# Patient Record
Sex: FEMALE | Race: WHITE | NOT HISPANIC OR LATINO | URBAN - METROPOLITAN AREA
[De-identification: names, ages, dates, MRNs, and addresses within clinical notes are randomized per-mention and may not be internally consistent; named-entity substitution may affect disease eponyms.]

---

## 2024-09-29 ENCOUNTER — EMERGENCY (EMERGENCY)
Facility: HOSPITAL | Age: 64
LOS: 1 days | Discharge: HOME | End: 2024-09-29
Attending: EMERGENCY MEDICINE | Admitting: EMERGENCY MEDICINE
Payer: MEDICAID

## 2024-09-29 VITALS
HEART RATE: 78 BPM | RESPIRATION RATE: 18 BRPM | DIASTOLIC BLOOD PRESSURE: 76 MMHG | HEIGHT: 67 IN | TEMPERATURE: 98 F | OXYGEN SATURATION: 96 % | WEIGHT: 192.9 LBS | SYSTOLIC BLOOD PRESSURE: 154 MMHG

## 2024-09-29 PROCEDURE — 93010 ELECTROCARDIOGRAM REPORT: CPT

## 2024-09-29 PROCEDURE — 99284 EMERGENCY DEPT VISIT MOD MDM: CPT

## 2024-09-30 NOTE — ED PROVIDER NOTE - CLINICAL SUMMARY MEDICAL DECISION MAKING FREE TEXT BOX
Patient is a 64-year-old female with past medical history hypertension who no longer requires medication presented to ED with concern for elevated blood pressure.  Pt reports systolic 180 at home. Pt reports she is not having any headache at this time- BP in /76, EKG normal. Patient is a 64-year-old female with past medical history hypertension who no longer requires medication presented to ED with concern for elevated blood pressure.  Pt reports systolic 180 at home. Pt reports she is not having any headache at this time- BP in /76, EKG normal. will d/c with PCP follow up

## 2024-09-30 NOTE — ED ADULT NURSE NOTE - CHIEF COMPLAINT QUOTE
Pt c/o hypertension. Pt c/o headache x 1 week. States she was checking her BP at home and  it was 180/100. No known hx of HTN. Denies blurry vision, CP, weakness, dizziness. No neuro deficits noted. Denies PMHx

## 2024-09-30 NOTE — ED PROVIDER NOTE - OBJECTIVE STATEMENT
Patient is a 64-year-old female with past medical history hypertension who no longer requires medication presented to ED with concern for elevated blood pressure.  Patient reports over the past few days she has felt a pressure-like sensation on the right side of her forehead.  Patient also reports for the past 3 weeks she has felt with tingling sensation and pain starting below her left breast and wraps around to the left side of her back.  Patient reports she has been checking her blood pressure at home for the past few days since she was having headache and today systolic blood pressure was as high as 180 which concerned her.  Patient reports many years ago she was on metoprolol for blood pressure and believes she needs to be restarted on this medication.  Patient otherwise denies fevers, chills, chest pain, palpitations, shortness of breath, abdominal pain, nausea, vomiting, syncope, blurry vision, dizziness, rashes.

## 2024-09-30 NOTE — ED PROVIDER NOTE - ATTENDING APP SHARED VISIT CONTRIBUTION OF CARE
63 y/o F with previous h/o HTN (no longer on medications) p/w high blood pressure readings at home.  She states that for the past 3 days her BP has been running high.  Pt also c/o headaches, which are relieved with Tylenol.  No HA at the time of my evaluation.  No fever, vision changes, vomiting, cp, sob, abd pain, weakness.      Well but anxious appearing, sitting comfortably in stretcher, awake and alert, nontoxic.  AF/VSS. NCAT EOMI PERRL, neck supple.  Lungs cta bl.  Cards nl S1/S2, RRR, no MRG.  Abd soft ntnd.  No pedal edema or calf tenderness.  No focal neuro deficits.    Pt with asymptomatic high blood pressure, with unconcerning reading in ED.  Exam is normal and she has no signs or sxs of end-organ damage to suggest hypertensive emergency.  Reassurance, return precautions, outpatient PMD follow-up for further BP eval/mgmt as needed.

## 2024-09-30 NOTE — ED PROVIDER NOTE - NSFOLLOWUPINSTRUCTIONS_ED_ALL_ED_FT
You were seen in the emergency department for high blood pressure.  Your blood pressure and physical exam was not concerning.  Please follow-up with your PMD for routine evaluation of your blood pressure.    Drink plenty of fluids.  You can take ibuprofen 600mg every 6 hours or Tylenol 650mg every 4 hours as needed for pain or fever.  Follow-up with your PMD in 24-48 hours.  Return to the emergency department for any new or worsening symptoms, such as headaches that are not relieved with over-the-counter medications, vision loss, intractable vomiting, chest pain, difficulty breathing, weakness to one side of the body, confusion or alteration in consciousness, or any other concerns.

## 2024-09-30 NOTE — ED ADULT NURSE NOTE - OBJECTIVE STATEMENT
Patient received to 27, A&Ox3 ambulatory at baseline, breathing even and unlabored, presenting to ED c/o elevated blood pressure. No RN intervention, provider at bedside providing dispo paperwork.

## 2024-09-30 NOTE — ED PROVIDER NOTE - PATIENT PORTAL LINK FT
You can access the FollowMyHealth Patient Portal offered by [facility] by registering at the following website: [email or web address] By joining [employer]’s FollowMyHealth portal, you will also be able to view your health information using other applications (apps) compatible with our system.

## 2024-10-23 ENCOUNTER — APPOINTMENT (OUTPATIENT)
Dept: PODIATRY | Facility: CLINIC | Age: 64
End: 2024-10-23

## 2025-03-26 ENCOUNTER — NON-APPOINTMENT (OUTPATIENT)
Age: 65
End: 2025-03-26

## 2025-03-26 ENCOUNTER — APPOINTMENT (OUTPATIENT)
Dept: OBGYN | Facility: CLINIC | Age: 65
End: 2025-03-26
Payer: MEDICAID

## 2025-03-26 VITALS
BODY MASS INDEX: 31.5 KG/M2 | SYSTOLIC BLOOD PRESSURE: 142 MMHG | WEIGHT: 196 LBS | HEIGHT: 66 IN | HEART RATE: 94 BPM | DIASTOLIC BLOOD PRESSURE: 79 MMHG

## 2025-03-26 DIAGNOSIS — Z01.419 ENCOUNTER FOR GYNECOLOGICAL EXAMINATION (GENERAL) (ROUTINE) W/OUT ABNORMAL FINDINGS: ICD-10-CM

## 2025-03-26 PROCEDURE — 99386 PREV VISIT NEW AGE 40-64: CPT

## 2025-03-26 PROCEDURE — 99459 PELVIC EXAMINATION: CPT

## 2025-03-27 LAB — HPV HIGH+LOW RISK DNA PNL CVX: NOT DETECTED

## 2025-04-06 LAB — CYTOLOGY CVX/VAG DOC THIN PREP: ABNORMAL

## 2025-04-10 ENCOUNTER — APPOINTMENT (OUTPATIENT)
Dept: OBGYN | Facility: CLINIC | Age: 65
End: 2025-04-10
Payer: MEDICAID

## 2025-04-10 DIAGNOSIS — R87.612 LOW GRADE SQUAMOUS INTRAEPITHELIAL LESION ON CYTOLOGIC SMEAR OF CERVIX (LGSIL): ICD-10-CM

## 2025-04-10 PROCEDURE — 99212 OFFICE O/P EST SF 10 MIN: CPT | Mod: 95
